# Patient Record
Sex: FEMALE | Race: WHITE | NOT HISPANIC OR LATINO | Employment: FULL TIME | URBAN - METROPOLITAN AREA
[De-identification: names, ages, dates, MRNs, and addresses within clinical notes are randomized per-mention and may not be internally consistent; named-entity substitution may affect disease eponyms.]

---

## 2022-01-27 ENCOUNTER — TELEPHONE (OUTPATIENT)
Dept: OTHER | Facility: OTHER | Age: 37
End: 2022-01-27

## 2022-01-27 NOTE — TELEPHONE ENCOUNTER
Pt called in to cancel appt for 1/28/2022 due to covid exposure  She will call back to reschedule when she's ready

## 2022-04-27 ENCOUNTER — OFFICE VISIT (OUTPATIENT)
Dept: FAMILY MEDICINE CLINIC | Facility: CLINIC | Age: 37
End: 2022-04-27
Payer: COMMERCIAL

## 2022-04-27 VITALS
BODY MASS INDEX: 42.89 KG/M2 | HEART RATE: 70 BPM | OXYGEN SATURATION: 97 % | TEMPERATURE: 98.3 F | SYSTOLIC BLOOD PRESSURE: 138 MMHG | WEIGHT: 283 LBS | HEIGHT: 68 IN | DIASTOLIC BLOOD PRESSURE: 80 MMHG

## 2022-04-27 DIAGNOSIS — Z76.89 ENCOUNTER FOR WEIGHT MANAGEMENT: ICD-10-CM

## 2022-04-27 DIAGNOSIS — Z00.00 ANNUAL PHYSICAL EXAM: Primary | ICD-10-CM

## 2022-04-27 PROCEDURE — 99395 PREV VISIT EST AGE 18-39: CPT | Performed by: NURSE PRACTITIONER

## 2022-04-27 PROCEDURE — 3008F BODY MASS INDEX DOCD: CPT | Performed by: NURSE PRACTITIONER

## 2022-04-27 PROCEDURE — 3725F SCREEN DEPRESSION PERFORMED: CPT | Performed by: NURSE PRACTITIONER

## 2022-04-27 RX ORDER — LAMOTRIGINE 25 MG/1
TABLET ORAL
COMMUNITY
Start: 2022-04-22 | End: 2022-06-29

## 2022-04-27 RX ORDER — SERTRALINE HYDROCHLORIDE 100 MG/1
TABLET, FILM COATED ORAL
COMMUNITY
Start: 2022-04-13 | End: 2022-06-29 | Stop reason: ALTCHOICE

## 2022-04-27 RX ORDER — BUPROPION HYDROCHLORIDE 150 MG/1
TABLET ORAL
COMMUNITY
Start: 2022-04-13 | End: 2022-04-27 | Stop reason: ALTCHOICE

## 2022-04-27 RX ORDER — ALPRAZOLAM 0.5 MG/1
0.5 TABLET ORAL DAILY PRN
COMMUNITY
Start: 2022-03-11 | End: 2022-06-29 | Stop reason: SDUPTHER

## 2022-04-27 NOTE — PATIENT INSTRUCTIONS

## 2022-04-27 NOTE — PROGRESS NOTES
ADULT ANNUAL [facility]    NAME: Patricia Huerta  AGE: 40 y o  SEX: female  : 1985     DATE: 2022     Assessment and Plan:     Problem List Items Addressed This Visit     None      Visit Diagnoses     Annual physical exam    -  Primary    Relevant Orders    Lipid panel    Comprehensive metabolic panel    TSH, 3rd generation with Free T4 reflex    CBC and differential    Encounter for weight management            Immunizations and preventive care screenings were discussed with patient today  Appropriate education was printed on patient's after visit summary  Counseling:  Alcohol/drug use: discussed moderation in alcohol intake, the recommendations for healthy alcohol use, and avoidance of illicit drug use  Dental Health: discussed importance of regular tooth brushing, flossing, and dental visits  Injury prevention: discussed safety/seat belts, safety helmets, smoke detectors, carbon dioxide detectors, and smoking near bedding or upholstery  Sexual health: discussed sexually transmitted diseases, partner selection, use of condoms, avoidance of unintended pregnancy, and contraceptive alternatives  · Exercise: the importance of regular exercise/physical activity was discussed  Recommend exercise 3-5 times per week for at least 30 minutes  BMI Counseling: Body mass index is 43 03 kg/m²  The BMI is above normal  Nutrition recommendations include decreasing portion sizes, encouraging healthy choices of fruits and vegetables, consuming healthier snacks, moderation in carbohydrate intake and increasing intake of lean protein  Exercise recommendations include exercising 3-5 times per week and strength training exercises  Rationale for BMI follow-up plan is due to patient being overweight or obese  Depression Screening and Follow-up Plan: Patient's depression screening was positive with a PHQ-2 score of 3   Their PHQ-9 score was 14  Patient assessed for underlying major depression  Brief counseling provided and recommend additional follow-up/re-evaluation next office visit  Return in about 1 year (around 2023)  Chief Complaint:     Chief Complaint   Patient presents with    Establish Care      History of Present Illness:     Adult Annual Physical   Patient here for a comprehensive physical exam  The patient reports that she sees a psychiatrist for management of major depressive disorder  She reports that she was on Cymbalta for 5-6 years but was changed over to Lexapro than Effexor which she did not tolerate either medication  She was than switched to Zoloft and Wellbutrin  She needed to be taken off the Wellbutrin due to increased rage issues  She was recently prescribed Lamictal but has not started the medication yet  She reports that her anxiety and depression have been increased over the pandemic due to increased isolation  She states that she is getting  in 8 months and is looking for a way to loss some weight  She reports that she is starting to go back to the gym and hired a   Discussion regarding medical bariatric or weight management medications occurred  Patient is interested in weight loss medication and has done some research on some in the past      Diet and Physical Activity  · Diet/Nutrition: well balanced diet and consuming 3-5 servings of fruits/vegetables daily  · Exercise: moderate cardiovascular exercise, 5-7 times a week on average and 30-60 minutes on average        Depression Screening  PHQ-2/9 Depression Screening    Little interest or pleasure in doing things: 2 - more than half the days  Feeling down, depressed, or hopeless: 1 - several days  Trouble falling or staying asleep, or sleeping too much: 3 - nearly every day  Feeling tired or having little energy: 3 - nearly every day  Poor appetite or overeatin - several days  Feeling bad about yourself - or that you are a failure or have let yourself or your family down: 0 - not at all  Trouble concentrating on things, such as reading the newspaper or watching television: 3 - nearly every day  Moving or speaking so slowly that other people could have noticed  Or the opposite - being so fidgety or restless that you have been moving around a lot more than usual: 1 - several days  Thoughts that you would be better off dead, or of hurting yourself in some way: 0 - not at all  PHQ-2 Score: 3  PHQ-2 Interpretation: POSITIVE depression screen  PHQ-9 Score: 14   PHQ-9 Interpretation: Moderate depression        General Health  · Sleep: sleeps well and gets 7-8 hours of sleep on average  · Hearing: normal - bilateral   · Vision: no vision problems and wears glasses  · Dental: regular dental visits and brushes teeth twice daily  /GYN Health  · Last menstrual period: 04/25/2022  · Contraceptive method: barrier methods  · History of STDs?: no      Review of Systems:     Review of Systems   Constitutional: Negative for activity change, appetite change and unexpected weight change  HENT: Negative for dental problem, ear pain, hearing loss, nosebleeds, sneezing, sore throat, tinnitus and trouble swallowing  Eyes: Negative for visual disturbance  Respiratory: Negative for cough, chest tightness, shortness of breath and wheezing  Cardiovascular: Negative for chest pain, palpitations and leg swelling  Gastrointestinal: Negative for abdominal pain, constipation, diarrhea and nausea  Endocrine: Negative for polydipsia and polyuria  Genitourinary: Negative  Musculoskeletal: Negative for arthralgias, back pain, myalgias and neck pain  Skin: Negative for color change and rash  Allergic/Immunologic: Negative for environmental allergies  Neurological: Negative for dizziness, weakness, light-headedness, numbness and headaches  Hematological: Negative  Psychiatric/Behavioral: Negative    Negative for dysphoric mood and sleep disturbance  The patient is not nervous/anxious  Past Medical History:     History reviewed  No pertinent past medical history  Past Surgical History:     History reviewed  No pertinent surgical history  Social History:     Social History     Socioeconomic History    Marital status: Single     Spouse name: None    Number of children: None    Years of education: None    Highest education level: None   Occupational History    None   Tobacco Use    Smoking status: Current Some Day Smoker     Types: Cigarettes    Smokeless tobacco: Never Used   Vaping Use    Vaping Use: Never used   Substance and Sexual Activity    Alcohol use: Yes     Alcohol/week: 10 0 standard drinks     Types: 10 Standard drinks or equivalent per week    Drug use: Never    Sexual activity: Yes     Partners: Male     Birth control/protection: None   Other Topics Concern    None   Social History Narrative    None     Social Determinants of Health     Financial Resource Strain: Not on file   Food Insecurity: Not on file   Transportation Needs: Not on file   Physical Activity: Not on file   Stress: Not on file   Social Connections: Not on file   Intimate Partner Violence: Not on file   Housing Stability: Not on file      Family History:     History reviewed  No pertinent family history  Current Medications:     Current Outpatient Medications   Medication Sig Dispense Refill    ALPRAZolam (XANAX) 0 5 mg tablet Take 0 5 mg by mouth daily as needed      sertraline (ZOLOFT) 100 mg tablet       lamoTRIgine (LaMICtal) 25 mg tablet  (Patient not taking: Reported on 4/27/2022 )       No current facility-administered medications for this visit  Allergies:     No Known Allergies   Physical Exam:     /80   Pulse 70   Temp 98 3 °F (36 8 °C)   Ht 5' 8" (1 727 m)   Wt 128 kg (283 lb)   LMP 04/25/2022 (Exact Date)   SpO2 97%   BMI 43 03 kg/m² (Reviewed)    Physical Exam  Vitals reviewed     Constitutional: General: She is not in acute distress  Appearance: Normal appearance  She is well-developed and well-groomed  She is not ill-appearing  HENT:      Head: Normocephalic and atraumatic  Right Ear: Tympanic membrane, ear canal and external ear normal       Left Ear: Tympanic membrane, ear canal and external ear normal       Nose: Nose normal       Mouth/Throat:      Lips: Pink  Mouth: Mucous membranes are moist       Pharynx: Oropharynx is clear  Eyes:      General: Lids are normal       Extraocular Movements: Extraocular movements intact  Conjunctiva/sclera: Conjunctivae normal       Pupils: Pupils are equal, round, and reactive to light  Neck:      Thyroid: No thyromegaly  Trachea: Trachea normal    Cardiovascular:      Rate and Rhythm: Normal rate and regular rhythm  Pulses: Normal pulses  Radial pulses are 2+ on the right side and 2+ on the left side  Dorsalis pedis pulses are 2+ on the right side and 2+ on the left side  Posterior tibial pulses are 2+ on the right side and 2+ on the left side  Heart sounds: Normal heart sounds  No murmur heard  Pulmonary:      Effort: Pulmonary effort is normal       Breath sounds: Normal breath sounds  Abdominal:      General: Abdomen is flat  Bowel sounds are normal       Palpations: Abdomen is soft  Tenderness: There is no abdominal tenderness  Musculoskeletal:         General: Normal range of motion  Cervical back: Normal range of motion and neck supple  Right lower leg: No edema  Left lower leg: No edema  Lymphadenopathy:      Cervical: No cervical adenopathy  Skin:     General: Skin is warm and dry  Capillary Refill: Capillary refill takes less than 2 seconds  Neurological:      General: No focal deficit present  Mental Status: She is alert and oriented to person, place, and time  Cranial Nerves: Cranial nerves are intact  Motor: Motor function is intact  Psychiatric:         Mood and Affect: Mood normal          Behavior: Behavior normal  Behavior is cooperative            [address]

## 2022-05-16 ENCOUNTER — TELEPHONE (OUTPATIENT)
Dept: FAMILY MEDICINE CLINIC | Facility: CLINIC | Age: 37
End: 2022-05-16

## 2022-05-16 DIAGNOSIS — Z76.89 ENCOUNTER FOR WEIGHT MANAGEMENT: Primary | ICD-10-CM

## 2022-05-16 RX ORDER — PHENTERMINE HYDROCHLORIDE 15 MG/1
15 CAPSULE ORAL EVERY MORNING
Qty: 30 CAPSULE | Refills: 0 | Status: SHIPPED | OUTPATIENT
Start: 2022-05-16 | End: 2022-06-29 | Stop reason: SDUPTHER

## 2022-05-16 NOTE — TELEPHONE ENCOUNTER
Pt called stating you had wanted her to start on Qsymia and she's still waiting to receive it  States there was some computer mishap and they are unsure of when they will ship it to the pt  She is asking if there is anything else you can prescribe for her

## 2022-05-16 NOTE — TELEPHONE ENCOUNTER
Patient is unable to get it from the company direct  She is asking if you can give her just plain phentermine instead   She said she has the other component of the medication at home and with the phentermine she could make her own cocktail

## 2022-05-17 DIAGNOSIS — Z76.89 ENCOUNTER FOR WEIGHT MANAGEMENT: Primary | ICD-10-CM

## 2022-05-17 RX ORDER — TOPIRAMATE 50 MG/1
100 TABLET, FILM COATED ORAL 2 TIMES DAILY
Qty: 30 TABLET | Refills: 0 | Status: SHIPPED | OUTPATIENT
Start: 2022-05-17 | End: 2022-05-25 | Stop reason: SDUPTHER

## 2022-05-25 DIAGNOSIS — Z76.89 ENCOUNTER FOR WEIGHT MANAGEMENT: ICD-10-CM

## 2022-05-25 RX ORDER — TOPIRAMATE 50 MG/1
100 TABLET, FILM COATED ORAL 2 TIMES DAILY
Qty: 120 TABLET | Refills: 0 | Status: SHIPPED | OUTPATIENT
Start: 2022-05-25 | End: 2022-06-16

## 2022-06-16 ENCOUNTER — APPOINTMENT (OUTPATIENT)
Dept: LAB | Facility: CLINIC | Age: 37
End: 2022-06-16
Payer: COMMERCIAL

## 2022-06-16 DIAGNOSIS — Z76.89 ENCOUNTER FOR WEIGHT MANAGEMENT: ICD-10-CM

## 2022-06-16 DIAGNOSIS — Z00.00 ANNUAL PHYSICAL EXAM: ICD-10-CM

## 2022-06-16 LAB
ALBUMIN SERPL BCP-MCNC: 4 G/DL (ref 3.5–5)
ALP SERPL-CCNC: 77 U/L (ref 46–116)
ALT SERPL W P-5'-P-CCNC: 45 U/L (ref 12–78)
ANION GAP SERPL CALCULATED.3IONS-SCNC: 3 MMOL/L (ref 4–13)
AST SERPL W P-5'-P-CCNC: 27 U/L (ref 5–45)
BASOPHILS # BLD AUTO: 0.08 THOUSANDS/ΜL (ref 0–0.1)
BASOPHILS NFR BLD AUTO: 1 % (ref 0–1)
BILIRUB SERPL-MCNC: 0.39 MG/DL (ref 0.2–1)
BUN SERPL-MCNC: 16 MG/DL (ref 5–25)
CALCIUM SERPL-MCNC: 9.6 MG/DL (ref 8.3–10.1)
CHLORIDE SERPL-SCNC: 111 MMOL/L (ref 100–108)
CHOLEST SERPL-MCNC: 151 MG/DL
CO2 SERPL-SCNC: 24 MMOL/L (ref 21–32)
CREAT SERPL-MCNC: 0.95 MG/DL (ref 0.6–1.3)
EOSINOPHIL # BLD AUTO: 0.06 THOUSAND/ΜL (ref 0–0.61)
EOSINOPHIL NFR BLD AUTO: 1 % (ref 0–6)
ERYTHROCYTE [DISTWIDTH] IN BLOOD BY AUTOMATED COUNT: 12.2 % (ref 11.6–15.1)
GFR SERPL CREATININE-BSD FRML MDRD: 76 ML/MIN/1.73SQ M
GLUCOSE P FAST SERPL-MCNC: 84 MG/DL (ref 65–99)
HCT VFR BLD AUTO: 45.8 % (ref 34.8–46.1)
HDLC SERPL-MCNC: 37 MG/DL
HGB BLD-MCNC: 15.3 G/DL (ref 11.5–15.4)
IMM GRANULOCYTES # BLD AUTO: 0.01 THOUSAND/UL (ref 0–0.2)
IMM GRANULOCYTES NFR BLD AUTO: 0 % (ref 0–2)
LDLC SERPL CALC-MCNC: 97 MG/DL (ref 0–100)
LYMPHOCYTES # BLD AUTO: 2.06 THOUSANDS/ΜL (ref 0.6–4.47)
LYMPHOCYTES NFR BLD AUTO: 26 % (ref 14–44)
MCH RBC QN AUTO: 30.9 PG (ref 26.8–34.3)
MCHC RBC AUTO-ENTMCNC: 33.4 G/DL (ref 31.4–37.4)
MCV RBC AUTO: 93 FL (ref 82–98)
MONOCYTES # BLD AUTO: 0.56 THOUSAND/ΜL (ref 0.17–1.22)
MONOCYTES NFR BLD AUTO: 7 % (ref 4–12)
NEUTROPHILS # BLD AUTO: 5.11 THOUSANDS/ΜL (ref 1.85–7.62)
NEUTS SEG NFR BLD AUTO: 65 % (ref 43–75)
NONHDLC SERPL-MCNC: 114 MG/DL
NRBC BLD AUTO-RTO: 0 /100 WBCS
PLATELET # BLD AUTO: 299 THOUSANDS/UL (ref 149–390)
PMV BLD AUTO: 10.6 FL (ref 8.9–12.7)
POTASSIUM SERPL-SCNC: 4.1 MMOL/L (ref 3.5–5.3)
PROT SERPL-MCNC: 8.3 G/DL (ref 6.4–8.2)
RBC # BLD AUTO: 4.95 MILLION/UL (ref 3.81–5.12)
SODIUM SERPL-SCNC: 138 MMOL/L (ref 136–145)
TRIGL SERPL-MCNC: 87 MG/DL
TSH SERPL DL<=0.05 MIU/L-ACNC: 1.7 UIU/ML (ref 0.45–4.5)
WBC # BLD AUTO: 7.88 THOUSAND/UL (ref 4.31–10.16)

## 2022-06-16 PROCEDURE — 80061 LIPID PANEL: CPT

## 2022-06-16 PROCEDURE — 84443 ASSAY THYROID STIM HORMONE: CPT

## 2022-06-16 PROCEDURE — 80053 COMPREHEN METABOLIC PANEL: CPT

## 2022-06-16 PROCEDURE — 36415 COLL VENOUS BLD VENIPUNCTURE: CPT

## 2022-06-16 PROCEDURE — 85025 COMPLETE CBC W/AUTO DIFF WBC: CPT

## 2022-06-16 RX ORDER — TOPIRAMATE 50 MG/1
TABLET, FILM COATED ORAL
Qty: 360 TABLET | Refills: 1 | Status: SHIPPED | OUTPATIENT
Start: 2022-06-16 | End: 2022-06-29 | Stop reason: ALTCHOICE

## 2022-06-20 ENCOUNTER — TELEPHONE (OUTPATIENT)
Dept: FAMILY MEDICINE CLINIC | Facility: CLINIC | Age: 37
End: 2022-06-20

## 2022-06-20 NOTE — TELEPHONE ENCOUNTER
Patient would like to talk to you about the new weight loss medication she is on     She and her family feel that it is causing negative mood changes     She doesn't want to abruptly stop the medication   She doesn't feel she needs to come in and see you, but would like you to call her to discuss it, as soon as you can

## 2022-06-21 NOTE — TELEPHONE ENCOUNTER
Decreasing sertraline to 50 mg daily and decrease Qysmia to lower dose than re-evaluate on 07/01/22 appointment

## 2022-06-29 ENCOUNTER — OFFICE VISIT (OUTPATIENT)
Dept: FAMILY MEDICINE CLINIC | Facility: CLINIC | Age: 37
End: 2022-06-29
Payer: COMMERCIAL

## 2022-06-29 VITALS
SYSTOLIC BLOOD PRESSURE: 126 MMHG | BODY MASS INDEX: 40.92 KG/M2 | HEIGHT: 68 IN | HEART RATE: 72 BPM | WEIGHT: 270 LBS | TEMPERATURE: 97.9 F | DIASTOLIC BLOOD PRESSURE: 82 MMHG

## 2022-06-29 DIAGNOSIS — E66.01 CLASS 3 SEVERE OBESITY DUE TO EXCESS CALORIES WITHOUT SERIOUS COMORBIDITY WITH BODY MASS INDEX (BMI) OF 40.0 TO 44.9 IN ADULT (HCC): Primary | ICD-10-CM

## 2022-06-29 DIAGNOSIS — F41.9 ANXIETY: ICD-10-CM

## 2022-06-29 PROCEDURE — 99214 OFFICE O/P EST MOD 30 MIN: CPT | Performed by: NURSE PRACTITIONER

## 2022-06-29 PROCEDURE — 3008F BODY MASS INDEX DOCD: CPT | Performed by: NURSE PRACTITIONER

## 2022-06-29 RX ORDER — PHENTERMINE HYDROCHLORIDE 15 MG/1
15 CAPSULE ORAL EVERY MORNING
Qty: 30 CAPSULE | Refills: 0 | Status: SHIPPED | OUTPATIENT
Start: 2022-06-29 | End: 2022-07-07

## 2022-06-29 RX ORDER — VENLAFAXINE HYDROCHLORIDE 37.5 MG/1
37.5 CAPSULE, EXTENDED RELEASE ORAL
Qty: 30 CAPSULE | Refills: 5 | Status: SHIPPED | OUTPATIENT
Start: 2022-06-29 | End: 2022-07-21

## 2022-06-29 RX ORDER — COVID-19 MOLECULAR TEST ASSAY
KIT MISCELLANEOUS
COMMUNITY
Start: 2022-05-02

## 2022-06-29 RX ORDER — ALPRAZOLAM 0.5 MG/1
0.5 TABLET ORAL DAILY PRN
Qty: 30 TABLET | Refills: 0 | Status: SHIPPED | OUTPATIENT
Start: 2022-06-29

## 2022-06-29 NOTE — PROGRESS NOTES
Assessment/Plan:    No problem-specific Assessment & Plan notes found for this encounter  Diagnoses and all orders for this visit:    Class 3 severe obesity due to excess calories without serious comorbidity with body mass index (BMI) of 40 0 to 44 9 in adult (HCC)  -     phentermine 15 MG capsule; Take 1 capsule (15 mg total) by mouth every morning    Anxiety  -     venlafaxine (EFFEXOR-XR) 37 5 mg 24 hr capsule; Take 1 capsule (37 5 mg total) by mouth daily with breakfast  -     ALPRAZolam (XANAX) 0 5 mg tablet; Take 1 tablet (0 5 mg total) by mouth daily as needed for anxiety    Other orders  -     ID Now COVID-19 KIT; TEST AS DIRECTED TODAY    #1 Obesity due to excess calories without serious comorbidity  Discussed with patient plan to take her off Qysmia and just treat with phentermine  #2 Anxiety  Discussed with patient plan to stop the sertraline and restart her back on venlafaxine  Patient requesting a refill on the alprazolam to be used as needed  Patient instructed to call if no improvement in 72 hours or symptoms worsen      Subjective:      Patient ID: Caroline Montesinos is a 40 y o  female  40 y  o female presenting to discuss weight loss medication and her anxiety issues  Patient was prescribed Qysmia for weight loss and was doing fine on the low dose but once the dose was increased to the next level her behavior started to change  She reports that she has been more irritable and having mood swings  She stopped taking the topiramate portion of the Qysema with some improvement noted in mood swings  She has lost a little over 22 pounds since starting the medication and would prefer not to stop it but concerned about emotional side effects  She also reports that the sertraline amaya snot seem to be helping manage her anxiety feelings  She was on Effexor in the past but stopped for some reason she is not sure of at present time   She was best managed while on the Effexor so would like to retry going back on it  She also states that she has tried other mood stablizers like Topamax in the past with hypermania episodes  History reviewed  No pertinent family history  Social History     Socioeconomic History    Marital status: Single     Spouse name: Not on file    Number of children: Not on file    Years of education: Not on file    Highest education level: Not on file   Occupational History    Not on file   Tobacco Use    Smoking status: Current Some Day Smoker     Types: Cigarettes    Smokeless tobacco: Never Used   Vaping Use    Vaping Use: Never used   Substance and Sexual Activity    Alcohol use: Yes     Alcohol/week: 10 0 standard drinks     Types: 10 Standard drinks or equivalent per week    Drug use: Never    Sexual activity: Yes     Partners: Male     Birth control/protection: None   Other Topics Concern    Not on file   Social History Narrative    Not on file     Social Determinants of Health     Financial Resource Strain: Not on file   Food Insecurity: Not on file   Transportation Needs: Not on file   Physical Activity: Not on file   Stress: Not on file   Social Connections: Not on file   Intimate Partner Violence: Not on file   Housing Stability: Not on file     E-Cigarette/Vaping    E-Cigarette Use Never User      E-Cigarette/Vaping Substances     History reviewed  No pertinent past medical history  History reviewed  No pertinent surgical history    No Known Allergies    Current Outpatient Medications:     ALPRAZolam (XANAX) 0 5 mg tablet, Take 1 tablet (0 5 mg total) by mouth daily as needed for anxiety, Disp: 30 tablet, Rfl: 0    ID Now COVID-19 KIT, TEST AS DIRECTED TODAY, Disp: , Rfl:     phentermine 15 MG capsule, Take 1 capsule (15 mg total) by mouth every morning, Disp: 30 capsule, Rfl: 0    venlafaxine (EFFEXOR-XR) 37 5 mg 24 hr capsule, Take 1 capsule (37 5 mg total) by mouth daily with breakfast, Disp: 30 capsule, Rfl: 5    Review of Systems   Constitutional: Negative  Respiratory: Negative  Cardiovascular: Negative  Gastrointestinal: Negative  Musculoskeletal: Negative  Neurological: Negative  Psychiatric/Behavioral: Negative for dysphoric mood, self-injury, sleep disturbance and suicidal ideas  The patient is nervous/anxious  Objective:    /82   Pulse 72   Temp 97 9 °F (36 6 °C)   Ht 5' 8" (1 727 m)   Wt 122 kg (270 lb)   BMI 41 05 kg/m² (Reviewed)     Physical Exam  Vitals reviewed  Constitutional:       General: She is not in acute distress  Appearance: She is well-developed and well-groomed  She is not ill-appearing  HENT:      Head: Normocephalic and atraumatic  Eyes:      General: Lids are normal       Extraocular Movements: Extraocular movements intact  Conjunctiva/sclera: Conjunctivae normal       Pupils: Pupils are equal, round, and reactive to light  Neck:      Trachea: Trachea and phonation normal    Cardiovascular:      Rate and Rhythm: Normal rate and regular rhythm  Heart sounds: Normal heart sounds  Pulmonary:      Effort: Pulmonary effort is normal       Breath sounds: Normal breath sounds  Musculoskeletal:      Cervical back: Neck supple  Skin:     General: Skin is warm and dry  Capillary Refill: Capillary refill takes less than 2 seconds  Neurological:      General: No focal deficit present  Mental Status: She is alert and oriented to person, place, and time  Psychiatric:         Mood and Affect: Mood is anxious  Speech: Speech is rapid and pressured  Behavior: Behavior normal  Behavior is cooperative  Thought Content:  Thought content normal

## 2022-07-07 DIAGNOSIS — E66.01 CLASS 3 SEVERE OBESITY DUE TO EXCESS CALORIES WITHOUT SERIOUS COMORBIDITY WITH BODY MASS INDEX (BMI) OF 40.0 TO 44.9 IN ADULT (HCC): ICD-10-CM

## 2022-07-07 RX ORDER — PHENTERMINE HYDROCHLORIDE 15 MG/1
15 CAPSULE ORAL EVERY MORNING
Qty: 30 CAPSULE | Refills: 0 | Status: SHIPPED | OUTPATIENT
Start: 2022-07-07 | End: 2023-01-04 | Stop reason: ALTCHOICE

## 2022-07-08 DIAGNOSIS — E66.01 CLASS 3 SEVERE OBESITY DUE TO EXCESS CALORIES WITHOUT SERIOUS COMORBIDITY WITH BODY MASS INDEX (BMI) OF 40.0 TO 44.9 IN ADULT (HCC): Primary | ICD-10-CM

## 2022-07-08 RX ORDER — PHENTERMINE HYDROCHLORIDE 30 MG/1
30 CAPSULE ORAL EVERY MORNING
Qty: 30 CAPSULE | Refills: 0 | Status: SHIPPED | OUTPATIENT
Start: 2022-07-08 | End: 2022-10-31 | Stop reason: SDUPTHER

## 2022-07-21 DIAGNOSIS — F41.9 ANXIETY: ICD-10-CM

## 2022-07-21 RX ORDER — VENLAFAXINE HYDROCHLORIDE 37.5 MG/1
CAPSULE, EXTENDED RELEASE ORAL
Qty: 90 CAPSULE | Refills: 2 | Status: SHIPPED | OUTPATIENT
Start: 2022-07-21 | End: 2022-09-09

## 2022-09-09 DIAGNOSIS — F41.9 ANXIETY: ICD-10-CM

## 2022-09-09 RX ORDER — VENLAFAXINE HYDROCHLORIDE 75 MG/1
75 CAPSULE, EXTENDED RELEASE ORAL DAILY
Qty: 90 CAPSULE | Refills: 1 | Status: SHIPPED | OUTPATIENT
Start: 2022-09-09 | End: 2023-01-23 | Stop reason: SDUPTHER

## 2022-10-31 DIAGNOSIS — E66.01 CLASS 3 SEVERE OBESITY DUE TO EXCESS CALORIES WITHOUT SERIOUS COMORBIDITY WITH BODY MASS INDEX (BMI) OF 40.0 TO 44.9 IN ADULT (HCC): ICD-10-CM

## 2022-11-02 DIAGNOSIS — F41.9 ANXIETY: ICD-10-CM

## 2022-11-03 RX ORDER — PHENTERMINE HYDROCHLORIDE 30 MG/1
30 CAPSULE ORAL EVERY MORNING
Qty: 30 CAPSULE | Refills: 0 | Status: SHIPPED | OUTPATIENT
Start: 2022-11-03

## 2022-11-03 RX ORDER — ALPRAZOLAM 0.5 MG/1
0.5 TABLET ORAL DAILY PRN
Qty: 30 TABLET | Refills: 0 | Status: SHIPPED | OUTPATIENT
Start: 2022-11-03

## 2022-11-03 NOTE — TELEPHONE ENCOUNTER
1 [id number] 09/09/2022 09/09/2022 ALPRAZolam (Tablet) 30 0 30 0 5 MG NA DARREN PEÑA [facility], [facility]  0 / 0 PA

## 2022-11-03 NOTE — TELEPHONE ENCOUNTER
1 [id number] 07/08/2022 07/08/2022 Phentermine Hcl (Capsule) 30 0 30 30 MG [facility]  0 / 0 PA

## 2022-11-16 ENCOUNTER — OFFICE VISIT (OUTPATIENT)
Dept: OBGYN CLINIC | Facility: CLINIC | Age: 37
End: 2022-11-16

## 2022-11-16 VITALS
BODY MASS INDEX: 41.05 KG/M2 | HEIGHT: 68 IN | SYSTOLIC BLOOD PRESSURE: 137 MMHG | HEART RATE: 71 BPM | DIASTOLIC BLOOD PRESSURE: 84 MMHG

## 2022-11-16 DIAGNOSIS — Z01.419 ENCOUNTER FOR GYNECOLOGICAL EXAMINATION WITHOUT ABNORMAL FINDING: Primary | ICD-10-CM

## 2022-11-16 DIAGNOSIS — Z20.2 POSSIBLE EXPOSURE TO STD: ICD-10-CM

## 2022-11-16 NOTE — PROGRESS NOTES
ASSESSMENT & PLAN: Danny Miller is a 40 y o  G0 obstetric history on file  with normal gynecologic exam     1   Routine well woman exam done today  2  Pap and HPV:  The patient's last pap and hpv was- 2 years ago  It was normal     Pap and cotesting was done today  Patient's history of abnormal Pap smear  Current ASCCP Guidelines reviewed  3   The following were reviewed in today's visit: breast self exam, STD testing, adequate intake of calcium and vitamin D, exercise, healthy diet and tobacco cessation  Patient consents to testing for chlamydia and gonorrhea  4  Gardisil vaccine in women up to age 39 discussed and information was provided  Check for coverage with your insurance  BMI Counseling: Body mass index is 41 05 kg/m²  The BMI is above normal  Nutrition recommendations include 3-5 servings of fruits/vegetables daily, moderation in carbohydrate intake and reducing intake of cholesterol  Exercise recommendations include exercising 3-5 times per week  Depression Screening Follow-up Plan: Patient's depression screening was positive with a PHQ-2 score of 0  Their PHQ-9 score was 0  Clinically patient does not have depression  No treatment is required  Tobacco Cessation Counseling: Tobacco cessation counseling was not provided  The patient is sincerely urged to quit consumption of tobacco  She is not ready to quit tobacco  The numerous health risks of tobacco consumption were discussed  CC:  Annual Gynecologic Examination    HPI: Danny Miller is a 40 y o  G0 obstetric history on file  who presents for annual gynecologic examination  She is a new patient to us  Hx of colposcopy about 5-6 years- f/u paps all neg  Her last Pap was about 2 years ago and negative per patient  Not using contraception  She stopped progesterone only birth control pills about 2 years ago  She is unsure if she desires a pregnancy  She has a stepdaughter was 5years old    Patient is also is getting  in 6 weeks  Health Maintenance:    She wears her seatbelt routinely  She does perform regular monthly self breast exams  She feels safe at home  Past Medical History:   Diagnosis Date   • Abnormal Pap smear of cervix        History reviewed  No pertinent surgical history  Past OB/Gyn History:  OB History        0    Para   0    Term   0       0    AB   0    Living   0       SAB   0    IAB   0    Ectopic   0    Multiple   0    Live Births   0               Pt does not have menstrual issues  History of sexually transmitted infection: No   History of abnormal pap smears: Yes as per HPI  Patient is currently sexually active  heterosexual   The current method of family planning is none  Family History   Problem Relation Age of Onset   • Multiple sclerosis Mother    • Heart attack Father    • Stroke Father    • No Known Problems Brother    • Breast cancer Neg Hx    • Colon cancer Neg Hx    • Ovarian cancer Neg Hx        Social History:  Social History     Socioeconomic History   • Marital status: Single     Spouse name: Not on file   • Number of children: Not on file   • Years of education: Not on file   • Highest education level: Not on file   Occupational History   • Not on file   Tobacco Use   • Smoking status: Some Days     Packs/day: 0 25     Years: 10 00     Pack years: 2 50     Types: Cigarettes     Passive exposure: Yes   • Smokeless tobacco: Never   • Tobacco comments:     Socially   Vaping Use   • Vaping Use: Never used   Substance and Sexual Activity   • Alcohol use:  Yes     Alcohol/week: 18 0 standard drinks     Types: 8 Glasses of wine, 10 Standard drinks or equivalent per week   • Drug use: Never   • Sexual activity: Yes     Partners: Male     Birth control/protection: None   Other Topics Concern   • Not on file   Social History Narrative   • Not on file     Social Determinants of Health     Financial Resource Strain: Low Risk    • Difficulty of Paying Living Expenses: Not hard at all   Food Insecurity: No Food Insecurity   • Worried About [address] in the Last Year: Never true   • Ran Out of Food in the Last Year: Never true   Transportation Needs: No Transportation Needs   • Lack of Transportation (Medical): No   • Lack of Transportation (Non-Medical): No   Physical Activity: Not on file   Stress: Not on file   Social Connections: Not on file   Intimate Partner Violence: Not At Risk   • Fear of Current or Ex-Partner: No   • Emotionally Abused: No   • Physically Abused: No   • Sexually Abused: No   Housing Stability: Low Risk    • Unable to Pay for Housing in the Last Year: No   • Number of Places Lived in the Last Year: 1   • Unstable Housing in the Last Year: No     Presently lives with family  Patient is engaged, getting  in 6 weeks  Patient is employed    No Known Allergies      Current Outpatient Medications:   •  ALPRAZolam (XANAX) 0 5 mg tablet, Take 1 tablet (0 5 mg total) by mouth daily as needed for anxiety, Disp: 30 tablet, Rfl: 0  •  phentermine 30 MG capsule, Take 1 capsule (30 mg total) by mouth every morning (Patient not taking: Reported on 11/16/2022), Disp: 30 capsule, Rfl: 0  •  venlafaxine (EFFEXOR-XR) 75 mg 24 hr capsule, Take 1 capsule (75 mg total) by mouth daily, Disp: 90 capsule, Rfl: 1  •  ID Now COVID-19 KIT, TEST AS DIRECTED TODAY, Disp: , Rfl:   •  phentermine 15 MG capsule, Take 1 capsule (15 mg total) by mouth every morning (Patient not taking: Reported on 11/16/2022), Disp: 30 capsule, Rfl: 0      Review of Systems  Constitutional :no fever, feels well, no tiredness, no recent weight gain or loss  ENT: no ear ache, no loss of hearing, no nosebleeds or nasal discharge, no sore throat or hoarseness  Cardiovascular: no complaints of slow or fast heart beat, no chest pain, no palpitations, no leg claudication or lower extremity edema    Respiratory: no complaints of shortness of shortness of breath, no AGARWAL  Breasts:no complaints of breast pain, breast lump, or nipple discharge  Gastrointestinal: no complaints of abdominal pain, constipation, nausea, vomiting, or diarrhea or bloody stools  Genitourinary : no complaints of dysuria, incontinence, pelvic pain, no dysmenorrhea, vaginal discharge or abnormal vaginal bleeding and as noted in HPI  Musculoskeletal: no complaints of arthralgia, no myalgia, no joint swelling or stiffness, no limb pain or swelling  Integumentary: no complaints of skin rash or lesion, itching or dry skin  Neurological: no complaints of headache, no confusion, no numbness or tingling, no dizziness or fainting    Objective      /84 (BP Location: Right arm, Patient Position: Sitting, Cuff Size: Adult)   Pulse 71   Ht 5' 8" (1 727 m)   LMP 10/30/2022   BMI 41 05 kg/m²   General:   appears stated age, cooperative, alert normal mood and affect   Neck: normal, supple,trachea midline, no masses   Heart: regular rate and rhythm, S1, S2 normal, no murmur, click, rub or gallop   Lungs: clear to auscultation bilaterally   Breasts: normal appearance, no masses or tenderness, Inspection negative, No nipple retraction or dimpling, No nipple discharge or bleeding, No axillary or supraclavicular adenopathy, Normal to palpation without dominant masses, Taught monthly breast self examination   Abdomen: soft, non-tender, without masses or organomegaly   Vulva: normal female genitalia, Bartholin's, Urethra, [place] normal   Vagina: normal vagina, no discharge, exudate, lesion, or erythema   Urethra: normal   Cervix: Normal, no discharge  PAP done  GCC done  Nontender  Uterus: normal size, contour, position, consistency, mobility, non-tender and anteverted   Adnexa: normal adnexa and no mass, fullness, tenderness   Lymphatic palpation of lymph nodes in neck, axilla, groin and/or other locations: no lymphadenopathy or masses noted   Skin normal skin turgor and no rashes     Psychiatric orientation to person, place, and time: normal  mood and affect: normal

## 2022-11-17 ENCOUNTER — TELEPHONE (OUTPATIENT)
Dept: OBGYN CLINIC | Facility: CLINIC | Age: 37
End: 2022-11-17

## 2022-11-17 LAB
C TRACH DNA SPEC QL NAA+PROBE: NEGATIVE
HPV HR 12 DNA CVX QL NAA+PROBE: NEGATIVE
HPV16 DNA CVX QL NAA+PROBE: NEGATIVE
HPV18 DNA CVX QL NAA+PROBE: NEGATIVE
N GONORRHOEA DNA SPEC QL NAA+PROBE: NEGATIVE

## 2022-11-17 NOTE — TELEPHONE ENCOUNTER
----- Message from Elizabeth Markham sent at 11/17/2022  7:59 AM EST -----  Culture for [facility] and CT Result is negative, please call patient to inform     Thanks

## 2022-11-21 LAB
LAB AP GYN PRIMARY INTERPRETATION: NORMAL
Lab: NORMAL

## 2022-11-22 ENCOUNTER — TELEPHONE (OUTPATIENT)
Dept: OBGYN CLINIC | Facility: CLINIC | Age: 37
End: 2022-11-22

## 2022-11-22 NOTE — TELEPHONE ENCOUNTER
Message left for patient that results are available in [facility]  Encouraged to call office with any questions/concerns

## 2023-01-04 ENCOUNTER — OFFICE VISIT (OUTPATIENT)
Dept: FAMILY MEDICINE CLINIC | Facility: CLINIC | Age: 38
End: 2023-01-04

## 2023-01-04 VITALS
TEMPERATURE: 98.1 F | OXYGEN SATURATION: 97 % | HEART RATE: 95 BPM | SYSTOLIC BLOOD PRESSURE: 122 MMHG | BODY MASS INDEX: 43.52 KG/M2 | DIASTOLIC BLOOD PRESSURE: 80 MMHG | WEIGHT: 286.2 LBS

## 2023-01-04 DIAGNOSIS — Z76.89 ENCOUNTER FOR WEIGHT MANAGEMENT: Primary | ICD-10-CM

## 2023-01-04 DIAGNOSIS — F41.9 ANXIETY: ICD-10-CM

## 2023-01-04 RX ORDER — BUPROPION HYDROCHLORIDE 150 MG/1
150 TABLET ORAL EVERY MORNING
Qty: 30 TABLET | Refills: 5 | Status: SHIPPED | OUTPATIENT
Start: 2023-01-04

## 2023-01-04 NOTE — PROGRESS NOTES
Name: Tylor Diaz      : 1985      MRN: [id number]  Encounter Provider: TRACEY Talbot  Encounter Date: 2023   Encounter department: [address] [place] [address]     1  Encounter for weight management  -     buPROPion (Wellbutrin XL) 150 mg 24 hr tablet; Take 1 tablet (150 mg total) by mouth every morning    2  Anxiety  -     buPROPion (Wellbutrin XL) 150 mg 24 hr tablet; Take 1 tablet (150 mg total) by mouth every morning  #1 Encounter for weight management  Discussed with patient plan to stop the phentermine due to increased anxiety but will restart bupropion to help quit smoking and potential weight loss   #2 Anxiety  Discussed with patient plan to continue on venlafaxine and add-on bupropion to stable mood while quit smoking  Patient instructed to call if no improvement in 72 hours or symptoms worsen       Subjective      38 y  o female presenting to discuss weight loss and possible change needed in anti-depressant  Patient reports that she was taking the phentermine and did lose some weight on it for her wedding but needed to stop it due to side effects  Patient states that she has regained all of the weight that she did lose on the medication  Other weight loss medications discussed with patient   patient also states that she is attempting to quit smoking so would like to restart the bupropion  Discussed with patient that she can continue on her Effexor and add on the bupropion with possible improvement in mood  Review of Systems   Constitutional: Negative  Respiratory: Negative  Cardiovascular: Negative  Gastrointestinal: Negative  Musculoskeletal: Negative  Neurological: Negative  Psychiatric/Behavioral: Positive for dysphoric mood  The patient is nervous/anxious          Current Outpatient Medications on File Prior to Visit   Medication Sig   • ALPRAZolam (XANAX) 0 5 mg tablet Take 1 tablet (0 5 mg total) by mouth daily as needed for anxiety   • venlafaxine (EFFEXOR-XR) 75 mg 24 hr capsule Take 1 capsule (75 mg total) by mouth daily   • ID Now COVID-19 KIT TEST AS DIRECTED TODAY (Patient not taking: Reported on 1/4/2023)       Objective     /80 (BP Location: Left arm, Patient Position: Sitting)   Pulse 95   Temp 98 1 °F (36 7 °C)   Wt 130 kg (286 lb 3 2 oz)   LMP 12/19/2022 (Exact Date)   SpO2 97%   BMI 43 52 kg/m²  (Reviewed)    Physical Exam  Vitals reviewed  Constitutional:       General: She is not in acute distress  Appearance: She is well-developed and well-groomed  She is not ill-appearing  HENT:      Head: Normocephalic and atraumatic  Eyes:      General: Lids are normal       Extraocular Movements: Extraocular movements intact  Conjunctiva/sclera: Conjunctivae normal       Pupils: Pupils are equal, round, and reactive to light  Neck:      Trachea: Trachea and phonation normal    Cardiovascular:      Rate and Rhythm: Normal rate and regular rhythm  Heart sounds: Normal heart sounds  Pulmonary:      Effort: Pulmonary effort is normal       Breath sounds: Normal breath sounds  Skin:     General: Skin is warm and dry  Capillary Refill: Capillary refill takes less than 2 seconds  Neurological:      General: No focal deficit present  Mental Status: She is alert and oriented to person, place, and time  Cranial Nerves: Cranial nerves 2-12 are intact  Psychiatric:         Mood and Affect: Mood is anxious and depressed  Speech: Speech normal          Behavior: Behavior normal  Behavior is cooperative  Thought Content:  Thought content normal        TRACEY Shi

## 2023-01-20 DIAGNOSIS — F41.9 ANXIETY: ICD-10-CM

## 2023-01-21 DIAGNOSIS — F41.9 ANXIETY: ICD-10-CM

## 2023-01-23 DIAGNOSIS — F41.9 ANXIETY: ICD-10-CM

## 2023-01-23 DIAGNOSIS — Z76.89 ENCOUNTER FOR WEIGHT MANAGEMENT: ICD-10-CM

## 2023-01-23 RX ORDER — ALPRAZOLAM 0.5 MG/1
0.5 TABLET ORAL DAILY PRN
Qty: 30 TABLET | Refills: 0 | Status: SHIPPED | OUTPATIENT
Start: 2023-01-23

## 2023-01-23 RX ORDER — ALPRAZOLAM 0.5 MG/1
TABLET ORAL
Qty: 30 TABLET | Refills: 0 | Status: SHIPPED | OUTPATIENT
Start: 2023-01-23

## 2023-01-23 RX ORDER — BUPROPION HYDROCHLORIDE 150 MG/1
150 TABLET ORAL EVERY MORNING
Qty: 30 TABLET | Refills: 0 | Status: SHIPPED | OUTPATIENT
Start: 2023-01-23

## 2023-01-23 RX ORDER — VENLAFAXINE HYDROCHLORIDE 75 MG/1
75 CAPSULE, EXTENDED RELEASE ORAL DAILY
Qty: 90 CAPSULE | Refills: 0 | Status: SHIPPED | OUTPATIENT
Start: 2023-01-23

## 2023-01-23 NOTE — TELEPHONE ENCOUNTER
[id number] 11/03/2022 11/03/2022 ALPRAZolam (Tablet)  30 0 30 0 5 MG NA CHELLE MADDOX  [facility] / 0 [place]     1  [id number] 09/09/2022 09/09/2022 ALPRAZolam (Tablet)  30 0 30 0 5 MG NA DARREN PEÑA  [facility] 0 / 0 [place]    1  [id number] 07/08/2022 07/08/2022 Phentermine Hcl (Capsule)  30 0 30 30 MG [facility] / 0 [place]    1  [id number] 06/29/2022 06/29/2022 ALPRAZolam (Tablet)  30 0 30 0 5 MG NA DARREN CASEY  [facility]

## 2023-01-23 NOTE — TELEPHONE ENCOUNTER
[id number] 11/03/2022 11/03/2022 ALPRAZolam (Tablet)  30 0 30 0 5 MG NA CHELLE MADDOX  [facility] 0 / 0 [place]     1  [id number] 09/09/2022 09/09/2022 ALPRAZolam (Tablet)  30 0 30 0 5 MG NA DARREN PEÑA  [facility] 0 / 0 [place]    1  [id number] 07/08/2022 07/08/2022 Phentermine Hcl (Capsule)  30 0 30 30 [facility] / 0

## 2023-04-21 DIAGNOSIS — F41.9 ANXIETY: ICD-10-CM

## 2023-04-24 DIAGNOSIS — F41.9 ANXIETY: ICD-10-CM

## 2023-04-24 DIAGNOSIS — Z76.89 ENCOUNTER FOR WEIGHT MANAGEMENT: ICD-10-CM

## 2023-04-24 RX ORDER — BUPROPION HYDROCHLORIDE 150 MG/1
TABLET ORAL
Qty: 30 TABLET | Refills: 0 | Status: SHIPPED | OUTPATIENT
Start: 2023-04-24

## 2023-04-24 RX ORDER — ALPRAZOLAM 0.5 MG/1
0.5 TABLET ORAL DAILY PRN
Qty: 30 TABLET | Refills: 0 | Status: SHIPPED | OUTPATIENT
Start: 2023-04-24

## 2023-04-24 NOTE — TELEPHONE ENCOUNTER
1  [id number] 02/22/2023 01/23/2023 ALPRAZolam (Tablet)  30 0 30 0 5 MG NA DARREN PEÑA  [facility] [place]    1  [id number] 01/23/2023 01/23/2023 ALPRAZolam (Tablet)  30 0 30 0 5 MG NA DARREN PEÑA  [facility] 0 / 0 [place]    1  [id number] 11/03/2022 11/03/2022 ALPRAZolam (Tablet)  30 0 30 0 5 MG NA CHELLE MADDOX  [facility] 0 / 0

## 2023-04-26 DIAGNOSIS — F41.9 ANXIETY: ICD-10-CM

## 2023-04-26 RX ORDER — VENLAFAXINE HYDROCHLORIDE 75 MG/1
CAPSULE, EXTENDED RELEASE ORAL
Qty: 30 CAPSULE | Refills: 2 | Status: SHIPPED | OUTPATIENT
Start: 2023-04-26

## 2023-05-18 ENCOUNTER — EVALUATION (OUTPATIENT)
Dept: PHYSICAL THERAPY | Facility: CLINIC | Age: 38
End: 2023-05-18

## 2023-05-18 DIAGNOSIS — G89.29 CHRONIC PAIN OF LEFT KNEE: Primary | ICD-10-CM

## 2023-05-18 DIAGNOSIS — M25.562 CHRONIC PAIN OF LEFT KNEE: Primary | ICD-10-CM

## 2023-05-18 NOTE — LETTER
May 18, 2023    Jamaica SamsonDO mario  [address] [facility] [address] [address]    Patient: Jeremy Crawford   YOB: 1985   Date of Visit: 2023     Encounter Diagnosis     ICD-10-CM    1  Chronic pain of left knee  M25 562     G89 29           Dear Dr Yeni Bolivar: Thank you for your recent referral of Jeremy Crawford  Please review the attached evaluation summary from [facility] recent visit  Please verify that you agree with the plan of care by signing the attached order  If you have any questions or concerns, please do not hesitate to call  I sincerely appreciate the opportunity to share in the care of one of your patients and hope to have another opportunity to work with you in the near future  Sincerely,    Lyudmila Leyva, PT      Referring Provider:      I certify that I have read the below Plan of Care and certify the need for these services furnished under this plan of treatment while under my care  Jemramincandelario SamsonDO mario  [address] [place] [address] Ave [address]  Via Fax: [phone]          PT Evaluation     Today's date: 2023  Patient name: Jeremy Crawford  : 1985  MRN: [id number]  Referring provider: Nathan Alexis, *  Dx:   Encounter Diagnosis     ICD-10-CM    1  Chronic pain of left knee  M25 562     G89 29                        Subjective Evaluation    History of Present Illness  Date of onset: 3/3/2023  Mechanism of injury: Jermey Crawford is a 45 y o  female who presents with signs and symptoms consistent of L knee pain following an unknown ROB  Pt feels it may have possibly occurred when she was getting off of her [place] bike and twisted her knee approx  2 months ago  Patient presents with pain and decreased strength  Due to these impairments, Patient has difficulty performing prolonged sitting and squatting as well as occasionally going up/down steps   Pt is a  and therefore does a good amount of sitting in the day  She previously had injured her L knee several years ago resulting in a bony bump per pt that never went away that was not painful however is tender, at times, after recent injury  Pt notes most pain is medial L knee and feels it is stiff or inflamed/swollen even though she does not feel it is visibly larger than the R knee  Patient would benefit from skilled physical therapy to address the impairments, improve their level of function, and to improve their overall quality of life  Quality of life: good    Pain  Current pain ratin  At best pain ratin  At worst pain ratin  Location: medial L knee  Quality: tight  Relieving factors: rest  Aggravating factors: stair climbing    Social Support  Stairs in house: yes   Lives with: spouse    Employment status: working  Hand dominance: right      Diagnostic Tests  X-ray: normal  Treatments  Current treatment: physical therapy  Patient Goals  Patient goals for therapy: increased strength          Assessment  Assessment details: Sukhdev Putnam is a 45 y o  female who presents with signs and symptoms consistent of chronic L knee pain  Patient presents with pain and decreased strength as well as poor movement patters  Due to these impairments, Patient has difficulty performing prolonged sitting, prolonged standing and squatting  Pt would like to return to the gym however does not want to worsen her knee any further in the process  Pt would also like to avoid using anti-inflammatories and learn strategies to reduce pain on her own  Patient would benefit from skilled physical therapy to address the impairments, improve their level of function, and to improve their overall quality of life    Impairments: abnormal movement, impaired physical strength, lacks appropriate home exercise program, pain with function and poor body mechanics  Understanding of Dx/Px/POC: good   Prognosis: good    Goals  Short Term Goals: to be achieved by 4 weeks  1) Patient to be "independent with basic HEP  2) Decrease pain to 2/10 at its worst   3) Increase LE strength by 1/2 MMT grade in all deficient planes  4) FOTO score will equal or be greater than 82    Long Term Goals: to be achieved by discharge  1) FOTO equal to or greater than 90  2) Ambulation to improve to maximal level of function  3) Stair negotiation will improve to reciprocal with 0/10 pain reported  4) Sit to stand transfers will improve to maximal level of function with no pain or lat  shift    Plan  Plan details: Progress pt tx as tolerated  Patient would benefit from: skilled physical therapy  Referral necessary: No  Planned modality interventions: cryotherapy  Planned therapy interventions: ADL training, balance, body mechanics training, gait training, functional ROM exercises, flexibility, home exercise program, therapeutic exercise, therapeutic activities, stretching, strengthening, manual therapy, joint mobilization, Choi taping, neuromuscular re-education and patient education  Frequency: 2x week  Duration in visits: 16  Duration in weeks: 8  Plan of Care beginning date: 5/18/2023  Treatment plan discussed with: patient          Objective     Observations     Additional Observation Details  TTP L medial knee-minor \"bump\" over medial knee     Active Range of Motion   Left Knee   Hyperextension  Flexion: 124 degrees     Right Knee   Hyperextension   Flexion: 132 degrees     Mobility   Patellar Mobility:   Left Knee   WFL: medial, lateral, superior and inferior       Strength/Myotome Testing     Left Hip   Planes of Motion   Flexion: 4+  Extension: 4  Abduction: 4-  External rotation: 4-  Internal rotation: 4-    Right Hip   Planes of Motion   Flexion: 4+  Extension: 4  Abduction: 4  External rotation: 4+  Internal rotation: 4+    Left Knee   Flexion: 4  Extension: 4    Right Knee   Normal strength    Additional Strength Details  Discomfort in L knee with hip IR/ER MMT    Tests     Left Knee   Negative lateral " Oskar, medial Oskar, patellar apprehension and patellar compression  Ambulation     Observational Gait   Walking speed and stride length within functional limits  Additional Observational Gait Details  Hip drop BLE c IR at knee & increased add  At B knee    Functional Assessment      Squat    Sitting toward right side               Precautions: standard      Manuals 5/18            Seated L knee distraction             Pat  jt mobs                                       Neuro Re-Ed             SLS             Tandem stance                                                                              Ther Ex             HS S (seated on mat) 15sx4            Bike             HS curls             SAQ/LAQ             Seated hip IR/ER c TB                                                    Ther Activity             Standing squat in mirror                          Gait Training             steps                          Modalities             ice

## 2023-05-18 NOTE — PROGRESS NOTES
PT Evaluation     Today's date: 2023  Patient name: Anthony Vallejo  : 1985  MRN: [id number]  Referring provider: Abdoul Smith, *  Dx:   Encounter Diagnosis     ICD-10-CM    1  Chronic pain of left knee  M25 562     G89 29                        Subjective Evaluation    History of Present Illness  Date of onset: 3/3/2023  Mechanism of injury: Anthony Vallejo is a 45 y o  female who presents with signs and symptoms consistent of L knee pain following an unknown ROB  Pt feels it may have possibly occurred when she was getting off of her [place] bike and twisted her knee approx  2 months ago  Patient presents with pain and decreased strength  Due to these impairments, Patient has difficulty performing prolonged sitting and squatting as well as occasionally going up/down steps  Pt is a  and therefore does a good amount of sitting in the day  She previously had injured her L knee several years ago resulting in a bony bump per pt that never went away that was not painful however is tender, at times, after recent injury  Pt notes most pain is medial L knee and feels it is stiff or inflamed/swollen even though she does not feel it is visibly larger than the R knee  Patient would benefit from skilled physical therapy to address the impairments, improve their level of function, and to improve their overall quality of life    Quality of life: good    Pain  Current pain ratin  At best pain ratin  At worst pain ratin  Location: medial L knee  Quality: tight  Relieving factors: rest  Aggravating factors: stair climbing    Social Support  Stairs in house: yes   Lives with: spouse    Employment status: working  Hand dominance: right      Diagnostic Tests  X-ray: normal  Treatments  Current treatment: physical therapy  Patient Goals  Patient goals for therapy: increased strength          Assessment  Assessment details: Anthony Vallejo is a 45 y o  female who presents with signs and symptoms consistent of chronic L knee pain  Patient presents with pain and decreased strength as well as poor movement patters  Due to these impairments, Patient has difficulty performing prolonged sitting, prolonged standing and squatting  Pt would like to return to the gym however does not want to worsen her knee any further in the process  Pt would also like to avoid using anti-inflammatories and learn strategies to reduce pain on her own  Patient would benefit from skilled physical therapy to address the impairments, improve their level of function, and to improve their overall quality of life  Impairments: abnormal movement, impaired physical strength, lacks appropriate home exercise program, pain with function and poor body mechanics  Understanding of Dx/Px/POC: good   Prognosis: good    Goals  Short Term Goals: to be achieved by 4 weeks  1) Patient to be independent with basic HEP  2) Decrease pain to 2/10 at its worst   3) Increase LE strength by 1/2 MMT grade in all deficient planes  4) FOTO score will equal or be greater than 82    Long Term Goals: to be achieved by discharge  1) FOTO equal to or greater than 90  2) Ambulation to improve to maximal level of function  3) Stair negotiation will improve to reciprocal with 0/10 pain reported  4) Sit to stand transfers will improve to maximal level of function with no pain or lat   shift    Plan  Plan details: Progress pt tx as tolerated  Patient would benefit from: skilled physical therapy  Referral necessary: No  Planned modality interventions: cryotherapy  Planned therapy interventions: ADL training, balance, body mechanics training, gait training, functional ROM exercises, flexibility, home exercise program, therapeutic exercise, therapeutic activities, stretching, strengthening, manual therapy, joint mobilization, Choi taping, neuromuscular re-education and patient education  Frequency: 2x week  Duration in visits: 16  Duration in weeks: 8  Plan of Care "beginning date: 5/18/2023  Treatment plan discussed with: patient          Objective     Observations     Additional Observation Details  TTP L medial knee-minor \"bump\" over medial knee     Active Range of Motion   Left Knee   Hyperextension  Flexion: 124 degrees     Right Knee   Hyperextension   Flexion: 132 degrees     Mobility   Patellar Mobility:   Left Knee   WFL: medial, lateral, superior and inferior  Strength/Myotome Testing     Left Hip   Planes of Motion   Flexion: 4+  Extension: 4  Abduction: 4-  External rotation: 4-  Internal rotation: 4-    Right Hip   Planes of Motion   Flexion: 4+  Extension: 4  Abduction: 4  External rotation: 4+  Internal rotation: 4+    Left Knee   Flexion: 4  Extension: 4    Right Knee   Normal strength    Additional Strength Details  Discomfort in L knee with hip IR/ER MMT    Tests     Left Knee   Negative lateral Oskar, medial Oskar, patellar apprehension and patellar compression  Ambulation     Observational Gait   Walking speed and stride length within functional limits  Additional Observational Gait Details  Hip drop BLE c IR at knee & increased add  At B knee    Functional Assessment      Squat    Sitting toward right side                Precautions: standard      Manuals 5/18            Seated L knee distraction             Pat  jt mobs                                       Neuro Re-Ed             SLS             Tandem stance                                                                              Ther Ex             HS S (seated on mat) 15sx4            Bike             HS curls             SAQ/LAQ             Seated hip IR/ER c TB                                                    Ther Activity             Standing squat in mirror                          Gait Training             steps                          Modalities             ice                             "

## 2023-05-22 ENCOUNTER — APPOINTMENT (OUTPATIENT)
Dept: PHYSICAL THERAPY | Facility: CLINIC | Age: 38
End: 2023-05-22
Payer: COMMERCIAL

## 2023-05-24 ENCOUNTER — OFFICE VISIT (OUTPATIENT)
Dept: PHYSICAL THERAPY | Facility: CLINIC | Age: 38
End: 2023-05-24

## 2023-05-24 DIAGNOSIS — G89.29 CHRONIC PAIN OF LEFT KNEE: Primary | ICD-10-CM

## 2023-05-24 DIAGNOSIS — M25.562 CHRONIC PAIN OF LEFT KNEE: Primary | ICD-10-CM

## 2023-05-24 NOTE — PROGRESS NOTES
"Daily Note     Today's date: 2023  Patient name: Keena Rogers  : 1985  MRN: [id number]  Referring provider: Zenaida Barker, *  Dx:   Encounter Diagnosis     ICD-10-CM    1  Chronic pain of left knee  M25 562     G89 29           Start Time: 430  Stop Time: 0850  Total time in clinic (min): 43 minutes    Subjective: The patient presents for the first follow-up appointment, reports that symptoms improved and that she was compliant most of the time with the initial HEP reporting less tightness in the knee afterwards  Objective: See treatment diary below      Assessment: Tolerated treatment well  Patient exhibited good technique with therapeutic exercises and would benefit from continued PT  Progressed HEP to include hooklying bridges today as well  Plan: Continue per plan of care  Progress treatment as tolerated  Precautions: standard      Manuals            Seated L knee distraction  RE           Pat  jt mobs  GII                                     Neuro Re-Ed             SLS             Tandem stance  30sx2 ea                                                                            Ther Ex             HS S (seated on mat) 15sx4            Bike  L4 8'           HS curls  33# BLE machine           LAQ  33# BLE machine           Seated hip IR/ER c TB  Red 20x ea           Bridges  3\"x20           Leg Press  85# 3x10                        Ther Activity             Standing squat in mirror             Step downs Retro  Lat  6in 10x ea  fw 3in   10x ea           Gait Training             steps                          Modalities             ice                               "

## 2023-05-26 DIAGNOSIS — F41.9 ANXIETY: ICD-10-CM

## 2023-05-26 DIAGNOSIS — Z76.89 ENCOUNTER FOR WEIGHT MANAGEMENT: ICD-10-CM

## 2023-05-26 RX ORDER — BUPROPION HYDROCHLORIDE 150 MG/1
TABLET ORAL
Qty: 30 TABLET | Refills: 0 | Status: SHIPPED | OUTPATIENT
Start: 2023-05-26

## 2023-05-26 RX ORDER — VENLAFAXINE HYDROCHLORIDE 75 MG/1
CAPSULE, EXTENDED RELEASE ORAL
Qty: 90 CAPSULE | Refills: 1 | Status: SHIPPED | OUTPATIENT
Start: 2023-05-26

## 2023-05-30 ENCOUNTER — APPOINTMENT (OUTPATIENT)
Dept: PHYSICAL THERAPY | Facility: CLINIC | Age: 38
End: 2023-05-30
Payer: COMMERCIAL

## 2023-05-31 ENCOUNTER — OFFICE VISIT (OUTPATIENT)
Dept: PHYSICAL THERAPY | Facility: CLINIC | Age: 38
End: 2023-05-31

## 2023-05-31 DIAGNOSIS — G89.29 CHRONIC PAIN OF LEFT KNEE: Primary | ICD-10-CM

## 2023-05-31 DIAGNOSIS — M25.562 CHRONIC PAIN OF LEFT KNEE: Primary | ICD-10-CM

## 2023-05-31 NOTE — PROGRESS NOTES
"Daily Note     Today's date: 2023  Patient name: Raisa Dale  : 1985  MRN: [id number]  Referring provider: Sima Lomas, *  Dx:   Encounter Diagnosis     ICD-10-CM    1  Chronic pain of left knee  M25 562     G89 29                      Subjective: Pt reports knee felt good over the weekend and was on the exercise bike  Some minor discomfort upon arrival in post  Knee but states has not been stretching and that could be why  Objective: See treatment diary below      Assessment: Tolerated treatment well including progressing to SLS with anti-rot    Patient demonstrated fatigue post treatment and would benefit from continued PT      Plan: Continue per plan of care  Progress treatment as tolerated  Precautions: standard      Manuals           Seated L knee distraction  RE           Pat  jt mobs  GII                                     Neuro Re-Ed             SLS c anti- rot   10# 5sx10 ea          Tandem stance  30sx2 ea                                                                            Ther Ex             HS S (seated on mat) 15sx4  15sx4          Bike  L4 8' L8 8'          HS curls  33# BLE machine 22# SL 20x ea          LAQ  33# BLE machine SL 22# 2x10 ea          Seated hip IR/ER c TB  Red 20x ea GN 20x ea          Bridges  3\"x20 On PBall 3sx20          Leg Press  85# 3x10 SL 65# 20x          Standing hip abd   GN 3x10 ea          Ther Activity             Retro walkout   20 5# in squat 5x          Lat  walkout   18# 3x ea          Step downs Retro  Lat  6in 10x ea  fw 3in   10x ea           Gait Training             steps                          Modalities             ice                               "

## 2023-06-02 ENCOUNTER — APPOINTMENT (OUTPATIENT)
Dept: PHYSICAL THERAPY | Facility: CLINIC | Age: 38
End: 2023-06-02
Payer: COMMERCIAL

## 2023-06-06 ENCOUNTER — OFFICE VISIT (OUTPATIENT)
Dept: PHYSICAL THERAPY | Facility: CLINIC | Age: 38
End: 2023-06-06
Payer: COMMERCIAL

## 2023-06-06 DIAGNOSIS — M25.562 CHRONIC PAIN OF LEFT KNEE: Primary | ICD-10-CM

## 2023-06-06 DIAGNOSIS — G89.29 CHRONIC PAIN OF LEFT KNEE: Primary | ICD-10-CM

## 2023-06-06 PROCEDURE — 97112 NEUROMUSCULAR REEDUCATION: CPT

## 2023-06-06 PROCEDURE — 97110 THERAPEUTIC EXERCISES: CPT

## 2023-06-06 NOTE — PROGRESS NOTES
"Daily Note     Today's date: 2023  Patient name: Brianda Nguyen  : 1985  MRN: [id number]  Referring provider: Yves Booker, *  Dx:   Encounter Diagnosis     ICD-10-CM    1  Chronic pain of left knee  M25 562     G89 29           Start Time: 1618  Stop Time: 1657  Total time in clinic (min): 39 minutes    Subjective: Pt notes she has been doing really good and has been having no pain as well as managing to workout at home  Objective: See treatment diary below      Assessment: Tolerated treatment well  Patient demonstrated fatigue post treatment and exhibited good technique with therapeutic exercises  Pt will trial 1 wk HEP and home workouts and follow up in approx  1 wk with potential d/c pending good tolerance to trial of doing on own  Plan: Potential discharge next visit  Precautions: standard      Manuals          Seated L knee distraction  RE           Pat  jt mobs  GII                                     Neuro Re-Ed             SLS c anti- rot   10# 5sx10 ea          Tandem stance  30sx2 ea           SL rebounder    10x ea         Lat  Walking    Yel  TB 15ftx4                                                Ther Ex             steamboats    9 5# 20x ea         HS S (seated on mat) 15sx4  15sx4 15sx4 ea         Bike  L4 8' L8 8' L8 7'         HS curls  33# BLE machine 22# SL 20x ea 22# SL 30x ea         LAQ  33# BLE machine SL 22# 2x10 ea SL 22# 30x ea         Seated hip IR/ER c TB  Red 20x ea GN 20x ea          Bridges  3\"x20 On PBall 3sx20          Leg Press  85# 3x10 SL 65# 20x          Hrs neut , IR/ER    30x ea         Standing hip abd   GN 3x10 ea          Ther Activity             Retro walkout   20 5# in squat 5x          Lat  walkout   18# 3x ea          Step downs Retro  Lat  6in 10x ea  fw 3in   10x ea           Gait Training             steps                          Modalities             ice                               "

## 2023-06-09 DIAGNOSIS — F41.9 ANXIETY: ICD-10-CM

## 2023-06-09 DIAGNOSIS — Z76.89 ENCOUNTER FOR WEIGHT MANAGEMENT: ICD-10-CM

## 2023-06-09 RX ORDER — BUPROPION HYDROCHLORIDE 150 MG/1
TABLET ORAL
Qty: 90 TABLET | Refills: 1 | Status: SHIPPED | OUTPATIENT
Start: 2023-06-09

## 2023-06-20 ENCOUNTER — OFFICE VISIT (OUTPATIENT)
Dept: PHYSICAL THERAPY | Facility: CLINIC | Age: 38
End: 2023-06-20
Payer: COMMERCIAL

## 2023-06-20 DIAGNOSIS — G89.29 CHRONIC PAIN OF LEFT KNEE: Primary | ICD-10-CM

## 2023-06-20 DIAGNOSIS — M25.562 CHRONIC PAIN OF LEFT KNEE: Primary | ICD-10-CM

## 2023-06-20 PROCEDURE — 97110 THERAPEUTIC EXERCISES: CPT

## 2023-06-20 NOTE — PROGRESS NOTES
"Discharge    Today's date: 2023  Patient name: Wilian José  : 1985  MRN: [id number]  Referring provider: Snatana Ac, *  Dx:   Encounter Diagnosis     ICD-10-CM    1  Chronic pain of left knee  M25 562     G89 29         Start Time:   Stop Time: 164  Total time in clinic (min): 26 minutes  Subjective: Pt presents to PT for d/c with improvements in ROM, strength, pain levels and overall functional activity tolerance  Pt has returned to activities pain free and is able to manage symptoms on own with HEP  HEP has been provided and reviewed with pt with verbalized understanding  Pt is in agreement with above POC  Pt reports the knee has been good and been walking and jogging on the knee  Pt went to a concert on Friday and walked in shoes she doesn't typically wear and did have some soreness that resolved within a few hours  Objective: See treatment diary below      Assessment: Pt demonstrates sig  Improvements in pain levels, exercise tolerance as well as overall awareness and body mechanics  Pt is I c HEP and has even returned to walking/jogging for exercise  Pt saw an increase from 58 to 99 in FOTO score and is has met her goals  Plan: The patient has met or exceeded her short & long term goals and is a candidate for discharge from formal PT care to an independent home program      Precautions: standard       Manuals         Seated L knee distraction  RE           Pat  jt mobs  GII                                     Neuro Re-Ed             SLS c anti- rot   10# 5sx10 ea          Tandem stance  30sx2 ea           SL rebounder    10x ea         Lat  Walking    Yel   TB 15ftx4                                                Ther Ex             steamboats    9 5# 20x ea         HS S (seated on mat) 15sx4  15sx4 15sx4 ea 15\"x4        Bike  L4 8' L8 8' L8 7' L8 8'        HS curls  33# BLE machine 22# SL 20x ea 22# SL 30x ea 22# SL 30x ea        LAQ  33# BLE " "machine SL 22# 2x10 ea SL 33# 30x ea SL 33# 30x ea        Seated hip IR/ER c TB  Red 20x ea GN 20x ea          Bridges  3\"x20 On PBall 3sx20          Leg Press  85# 3x10 SL 65# 20x          Hrs neut , IR/ER    30x ea         Standing hip abd   GN 3x10 ea          Ther Activity             Retro walkout   20 5# in squat 5x          Lat  walkout   18# 3x ea          Step downs Retro  Lat  6in 10x ea  fw 3in   10x ea           Gait Training             steps                          Modalities             ice                               "

## 2023-07-20 ENCOUNTER — OFFICE VISIT (OUTPATIENT)
Dept: BARIATRICS | Facility: CLINIC | Age: 38
End: 2023-07-20
Payer: COMMERCIAL

## 2023-07-20 VITALS
DIASTOLIC BLOOD PRESSURE: 88 MMHG | BODY MASS INDEX: 42.98 KG/M2 | RESPIRATION RATE: 16 BRPM | SYSTOLIC BLOOD PRESSURE: 126 MMHG | WEIGHT: 283.6 LBS | HEART RATE: 66 BPM | HEIGHT: 68 IN

## 2023-07-20 DIAGNOSIS — E66.01 OBESITY, CLASS III, BMI 40-49.9 (MORBID OBESITY) (HCC): Primary | ICD-10-CM

## 2023-07-20 DIAGNOSIS — F41.9 ANXIETY: ICD-10-CM

## 2023-07-20 DIAGNOSIS — Z91.89 AT RISK FOR SLEEP APNEA: ICD-10-CM

## 2023-07-20 PROBLEM — Z20.2 POSSIBLE EXPOSURE TO STD: Status: RESOLVED | Noted: 2022-11-16 | Resolved: 2023-07-20

## 2023-07-20 PROCEDURE — 99204 OFFICE O/P NEW MOD 45 MIN: CPT | Performed by: NURSE PRACTITIONER

## 2023-07-20 NOTE — PROGRESS NOTES
Assessment/Plan:    Obesity, Class III, BMI 40-49.9 (morbid obesity) ([address])  - Discussed options of HealthyCORE-Intensive Lifestyle Intervention Program, Very Low Calorie Diet-VLCD, Conservative Program, Chad-En-Y Gastric Bypass and Vertical Sleeve Gastrectomy and the role of weight loss medications. - Explained the importance of making lifestyle changes with weight loss medications.   - Not interested in weight loss surgery. - Patient is interested in pursuing Conservative Program  - Initial weight loss goal of 5-10% weight loss for improved health  - Weight loss can improve patient's co-morbid conditions and/or prevent weight-related complications. - Qsymia and phentermine in the past caused mood changes. - Interested in weight loss medications to help with weight loss. - FDA approved weight loss medications reviewed: Wegovy, Saxenda, Qsymia, Contrave, and Phentermine.  shortage discussed. - Will obtain blood work first. Can consider metformin off label versus Saxenda. - Patient denies personal history of pancreatitis. Patient also denies personal and family history of thyroid cancer and multiple endocrine neoplasia type 2 (MEN 2 tumor). - Discussed the need for birth control while on weight loss medications, as pregnancy not recommended while taking weight loss medication. If pregnancy desired in the future, needs to be off of medications for at least 3 months prior to trying to conceive. - Medication contract signed 7/20/2023.  - Stop Sunday Nascimento 3/8  - Labs reviewed: Check CMP, lipid panel, A1C, fasting insulin, and TSH. Goals:  Do not skip meals. Food log (ie.) www.HiConversion.com,MyBuilder,[employer],calorieking. com,etc. baritastic (use [employer], dietdoctor. com or smartphone deangelo Broccol-e-games for recipes)  No sugary beverages. At least 64oz of water daily. Increase physical activity by 10 minutes daily.  Gradually increase physical activity to a goal of 5 days per week for 30 minutes of MODERATE intensity PLUS 2 days per week of FULL BODY resistance training (use smartphone apps FitON, Home Workout, etc.)  Start food logging, weighing and measuring food, including alcohol. Be mindful of alcohol. 7301-9128 calories per day. Sample menu given. Keep up the great work with water intake, at least 64 oz daily. Keep up the great work with exercise and gradually increase to goal of 5 days per week for 30 minutes. Anxiety  - Taking Effexor and xanax as needed. Continue management with prescribing provider. At risk for sleep apnea  - Stop bang 3/8, wakes up gasping for breath and feels tired. - Referral to sleep medicine placed. Lachelle Webster was seen today for consult. Diagnoses and all orders for this visit:    Obesity, Class III, BMI 40-49.9 (morbid obesity) ([facility])  -     Comprehensive metabolic panel; Future  -     Lipid Panel with Direct LDL reflex; Future  -     TSH, 3rd generation with Free T4 reflex; Future  -     HEMOGLOBIN A1C W/ EAG ESTIMATION; Future  -     Insulin, fasting; Future  -     Cancel: ECG 12 lead; Future    At risk for sleep apnea  -     Ambulatory referral to Sleep Medicine; Future    Anxiety        Total time spent: 50 min, with >50% face-to-face time spent counseling patient on nonsurgical interventions for the treatment of excess weight. Discussed in detail nonsurgical options including intensive lifestyle intervention program, very low-calorie diet program and conservative program.  Discussed the role of weight loss medications. Counseled patient on diet behavior and exercise modification for weight loss. Follow up in approximately 2 month nurse visit and 4 months with Non-Surgical Physician/Advanced Practitioner.     Subjective:   Chief Complaint   Patient presents with   • Consult     MWM consult; waist 45in; goal wt 180       Patient ID: Mari George  is a 45 y.o. female with excess weight/obesity here to pursue weight management. Previous notes and records have been reviewed. Past Medical History:   Diagnosis Date   • Abnormal Pap smear of cervix      Past Surgical History:   Procedure Laterality Date   • GASTRIC BALLOON      • VEIN SURGERY  10/2022       HPI:  Wt Readings from Last 20 Encounters:   07/20/23 129 kg (283 lb 9.6 oz)   01/04/23 130 kg (286 lb 3.2 oz)   06/29/22 122 kg (270 lb)   04/27/22 128 kg (283 lb)     Obesity/Excess Weight:  Severity: Severe  Onset:  Whole life    Modifiers: Diet and Exercise, Prescription Weight Loss Medications and gastric balloon in the past  Contributing factors: Insufficient Physical Activity and larger portion of foods   Associated symptoms: increased joint pain and affecting mood and relationships    Had gastric balloon several years ago in [place] was in for 6 months and then removed. Qsymia in the past - had mood changes. Phentermine tried - mood changes    Was binging and purging in teenage years. Had treatment for it. No further purging. Tends to eat larger portions.      Hydration: 64 oz water daily, 1-2 cups coffee with SF creamer or black, occ diet soda  Alcohol: about 10 drinks per weekend  Smoking: cigarettes on the weekends   Exercise: peloton or walking 3 days per week about 30 minutes  Occupation:  - working from home  Sleep: 8 hours  STOP bang: 3/8, has woken up gasping, and feels tired during the day    Highest weight: current  Current weight: 283.6 lbs BMI 43.12  Goal weight: lose 100 lbs    Colonoscopy: N/A  Mammogram: N/A    The following portions of the patient's history were reviewed and updated as appropriate: allergies, current medications, past family history, past medical history, past social history, past surgical history, and problem list.    Family History   Problem Relation Age of Onset   • Multiple sclerosis Mother    • Heart attack Father    • Stroke Father    • No Known Problems Brother    • Diabetes Maternal Grandmother    • Breast cancer Neg Hx    • Colon cancer Neg Hx    • Ovarian cancer Neg Hx    • Cancer Neg Hx    • Thyroid disease Neg Hx         Review of Systems   HENT: Negative for sore throat. Respiratory: Negative for cough and shortness of breath. Cardiovascular: Negative for chest pain and palpitations. Gastrointestinal: Negative for constipation, diarrhea, nausea and vomiting. Denies GERD   Endocrine: Positive for heat intolerance. Negative for cold intolerance. Genitourinary: Negative for dysuria. Musculoskeletal: Negative for arthralgias and back pain. Skin: Negative for rash. Neurological: Negative for headaches. Psychiatric/Behavioral: Negative for suicidal ideas (or HI). + depression and anxiety controlled with medication       Objective:  /88   Pulse 66   Resp 16   Ht 5' 8" (1.727 m)   Wt 129 kg (283 lb 9.6 oz)   BMI 43.12 kg/m²     Physical Exam  Vitals and nursing note reviewed. Constitutional   General appearance: Abnormal.  well developed and morbidly obese. Eyes No conjunctival injection. Ears, Nose, Mouth, and Throat Oral mucosa moist.   Pulmonary   Respiratory effort: No increased work of breathing or signs of respiratory distress. Cardiovascular    Examination of extremities for edema and/or varicosities: Normal.  no edema. Abdomen   Abdomen: Abnormal.  The abdomen was obese.     Musculoskeletal   Normal range of motion  Neurological   Gait and station: Normal.   Psychiatric   Orientation to person, place and time: Normal.    Affect: appropriate

## 2023-07-21 NOTE — ASSESSMENT & PLAN NOTE
- Discussed options of HealthyCORE-Intensive Lifestyle Intervention Program, Very Low Calorie Diet-VLCD, Conservative Program, Chad-En-Y Gastric Bypass and Vertical Sleeve Gastrectomy and the role of weight loss medications. - Explained the importance of making lifestyle changes with weight loss medications.   - Not interested in weight loss surgery. - Patient is interested in pursuing Conservative Program  - Initial weight loss goal of 5-10% weight loss for improved health  - Weight loss can improve patient's co-morbid conditions and/or prevent weight-related complications. - Qsymia and phentermine in the past caused mood changes. - Interested in weight loss medications to help with weight loss. - FDA approved weight loss medications reviewed: Wegovy, Saxenda, Qsymia, Contrave, and Phentermine. [facility] ANALIA solis discussed. - Will obtain blood work first. Can consider metformin off label versus Saxenda. - Patient denies personal history of pancreatitis. Patient also denies personal and family history of thyroid cancer and multiple endocrine neoplasia type 2 (MEN 2 tumor). - Discussed the need for birth control while on weight loss medications, as pregnancy not recommended while taking weight loss medication. If pregnancy desired in the future, needs to be off of medications for at least 3 months prior to trying to conceive. - Medication contract signed 7/20/2023.  - Stop [place] 3/8  - Labs reviewed: Check CMP, lipid panel, A1C, fasting insulin, and TSH. Goals:  Do not skip meals. Food log (ie.) www.Unomy.com,[employer]gi[employer]ix,BIOSAFE,calorieking. com,etc. baritastic (use [employer], dietdoctor. com or smartphone deangelo [employer] for recipes)  No sugary beverages. At least 64oz of water daily. Increase physical activity by 10 minutes daily.  Gradually increase physical activity to a goal of 5 days per week for 30 minutes of MODERATE intensity PLUS 2 days per week of FULL BODY resistance training (use smartphone apps Niels Cannon, etc.)  Start food logging, weighing and measuring food, including alcohol. Be mindful of alcohol. 5033-4421 calories per day. Sample menu given. Keep up the great work with water intake, at least 64 oz daily. Keep up the great work with exercise and gradually increase to goal of 5 days per week for 30 minutes.

## 2023-07-25 NOTE — TELEPHONE ENCOUNTER
Called and LVM for patient call back regarding lab results . Letter also sent to home address. Called x 2   The other weight loss medication is more expensive

## 2023-07-26 ENCOUNTER — PATIENT MESSAGE (OUTPATIENT)
Dept: BARIATRICS | Facility: CLINIC | Age: 38
End: 2023-07-26

## 2023-07-26 ENCOUNTER — APPOINTMENT (OUTPATIENT)
Dept: LAB | Facility: CLINIC | Age: 38
End: 2023-07-26
Payer: COMMERCIAL

## 2023-07-26 DIAGNOSIS — E66.01 OBESITY, CLASS III, BMI 40-49.9 (MORBID OBESITY) (HCC): ICD-10-CM

## 2023-07-26 DIAGNOSIS — E66.01 OBESITY, CLASS III, BMI 40-49.9 (MORBID OBESITY) (HCC): Primary | ICD-10-CM

## 2023-07-26 LAB
ALBUMIN SERPL BCP-MCNC: 3.9 G/DL (ref 3.5–5)
ALP SERPL-CCNC: 77 U/L (ref 46–116)
ALT SERPL W P-5'-P-CCNC: 42 U/L (ref 12–78)
ANION GAP SERPL CALCULATED.3IONS-SCNC: 4 MMOL/L
AST SERPL W P-5'-P-CCNC: 32 U/L (ref 5–45)
BILIRUB SERPL-MCNC: 0.71 MG/DL (ref 0.2–1)
BUN SERPL-MCNC: 12 MG/DL (ref 5–25)
CALCIUM SERPL-MCNC: 9.9 MG/DL (ref 8.3–10.1)
CHLORIDE SERPL-SCNC: 107 MMOL/L (ref 96–108)
CHOLEST SERPL-MCNC: 169 MG/DL
CO2 SERPL-SCNC: 27 MMOL/L (ref 21–32)
CREAT SERPL-MCNC: 0.78 MG/DL (ref 0.6–1.3)
EST. AVERAGE GLUCOSE BLD GHB EST-MCNC: 105 MG/DL
GFR SERPL CREATININE-BSD FRML MDRD: 96 ML/MIN/1.73SQ M
GLUCOSE P FAST SERPL-MCNC: 86 MG/DL (ref 65–99)
HBA1C MFR BLD: 5.3 %
HDLC SERPL-MCNC: 46 MG/DL
INSULIN SERPL-ACNC: 9.53 UIU/ML (ref 1.9–23)
LDLC SERPL CALC-MCNC: 101 MG/DL (ref 0–100)
POTASSIUM SERPL-SCNC: 4.5 MMOL/L (ref 3.5–5.3)
PROT SERPL-MCNC: 8.1 G/DL (ref 6.4–8.4)
SODIUM SERPL-SCNC: 138 MMOL/L (ref 135–147)
TRIGL SERPL-MCNC: 110 MG/DL
TSH SERPL DL<=0.05 MIU/L-ACNC: 1.57 UIU/ML (ref 0.45–4.5)

## 2023-07-26 PROCEDURE — 80053 COMPREHEN METABOLIC PANEL: CPT

## 2023-07-26 PROCEDURE — 36415 COLL VENOUS BLD VENIPUNCTURE: CPT

## 2023-07-26 PROCEDURE — 80061 LIPID PANEL: CPT

## 2023-07-26 PROCEDURE — 83036 HEMOGLOBIN GLYCOSYLATED A1C: CPT

## 2023-07-26 PROCEDURE — 83525 ASSAY OF INSULIN: CPT

## 2023-07-26 PROCEDURE — 84443 ASSAY THYROID STIM HORMONE: CPT

## 2023-07-28 NOTE — TELEPHONE ENCOUNTER
Josehart, Generic 7/28/2023 1:53 PM EDT    [facility] [address] in [place], [place]     or [facility] is a backup option.  [address]

## 2023-07-31 ENCOUNTER — TELEPHONE (OUTPATIENT)
Dept: BARIATRICS | Facility: CLINIC | Age: 38
End: 2023-07-31

## 2023-07-31 ENCOUNTER — PATIENT MESSAGE (OUTPATIENT)
Dept: BARIATRICS | Facility: CLINIC | Age: 38
End: 2023-07-31

## 2023-07-31 DIAGNOSIS — E66.01 OBESITY, CLASS III, BMI 40-49.9 (MORBID OBESITY) (HCC): ICD-10-CM

## 2023-07-31 RX ORDER — LIRAGLUTIDE 6 MG/ML
INJECTION, SOLUTION SUBCUTANEOUS
Qty: 15 ML | Refills: 4 | OUTPATIENT
Start: 2023-07-31

## 2023-07-31 NOTE — TELEPHONE ENCOUNTER
Patient called stating that she found the [address] at [facility]. Please send a script there, it is already in her chart.

## 2023-07-31 NOTE — TELEPHONE ENCOUNTER
Perez sent to [facility] and patient updated via [address] [place] [address].

## 2023-07-31 NOTE — TELEPHONE ENCOUNTER
This is an Nely's patient she found Booker at [facility] and they are asking for a script to be efaxed to them. The pharm is already in her chart can you please send it to them.

## 2023-07-31 NOTE — TELEPHONE ENCOUNTER
Josehart, Generic 7/31/2023 9:35 AM EDT    Can you please have my script go to [facility] they have in [address] [place] [address].

## 2023-08-10 DIAGNOSIS — F41.9 ANXIETY: ICD-10-CM

## 2023-08-10 RX ORDER — VENLAFAXINE HYDROCHLORIDE 75 MG/1
75 CAPSULE, EXTENDED RELEASE ORAL DAILY
Qty: 90 CAPSULE | Refills: 0 | Status: SHIPPED | OUTPATIENT
Start: 2023-08-10

## 2023-08-10 RX ORDER — ALPRAZOLAM 0.5 MG/1
0.5 TABLET ORAL DAILY PRN
Qty: 30 TABLET | Refills: 0 | Status: SHIPPED | OUTPATIENT
Start: 2023-08-10

## 2023-08-10 NOTE — TELEPHONE ENCOUNTER
1 [id number] 04/24/2023 04/24/2023 ALPRAZolam (Tablet) 30.0 30 0.5 MG NA DRAREN [facility] [employer] 0 / 0 [place]    1 [id number] 02/22/2023 01/23/2023 ALPRAZolam (Tablet) 30.0 30 0.5 MG NA [facility] [employer] 0 / 0 [place]    1 [id number] 01/23/2023 01/23/2023 ALPRAZolam (Tablet) 30.0 30 0.5 MG [facility] [employer] 0 / 0 [place]    1 [id number] 11/03/2022 11/03/2022 ALPRAZolam (Tablet) 30.0 30 0.5 MG NA CHELLE MADDOX [facility] [employer] 0 / 0 [place]    1 [id number] 09/09/2022 09/09/2022 ALPRAZolam (Tablet) 30.0 30 0.5 MG NA [facility]  [employer] 0 / 0 [place]

## 2023-09-05 ENCOUNTER — PATIENT MESSAGE (OUTPATIENT)
Dept: BARIATRICS | Facility: CLINIC | Age: 38
End: 2023-09-05

## 2023-09-07 ENCOUNTER — TELEPHONE (OUTPATIENT)
Dept: BARIATRICS | Facility: CLINIC | Age: 38
End: 2023-09-07

## 2023-09-07 DIAGNOSIS — E66.01 OBESITY, CLASS III, BMI 40-49.9 (MORBID OBESITY) (HCC): ICD-10-CM

## 2023-09-11 NOTE — TELEPHONE ENCOUNTER
Josét, Generic 9/9/2023 7:28 PM EDT    Rakesh Mckay,     I’m not having good luck refilling Saxenda right now ans with the continued red allergic reaction. I am going to stop. Can we talk about an alternative medication? I have a nurse appointment on the 20th.